# Patient Record
Sex: FEMALE | Race: WHITE
[De-identification: names, ages, dates, MRNs, and addresses within clinical notes are randomized per-mention and may not be internally consistent; named-entity substitution may affect disease eponyms.]

---

## 2018-03-20 ENCOUNTER — HOSPITAL ENCOUNTER (INPATIENT)
Dept: HOSPITAL 92 - L&D | Age: 33
LOS: 2 days | Discharge: HOME | End: 2018-03-22
Attending: FAMILY MEDICINE | Admitting: FAMILY MEDICINE
Payer: MEDICAID

## 2018-03-20 VITALS — BODY MASS INDEX: 35.3 KG/M2

## 2018-03-20 DIAGNOSIS — O99.89: ICD-10-CM

## 2018-03-20 DIAGNOSIS — D64.89: ICD-10-CM

## 2018-03-20 DIAGNOSIS — Z3A.39: ICD-10-CM

## 2018-03-20 DIAGNOSIS — O34.211: Primary | ICD-10-CM

## 2018-03-20 DIAGNOSIS — N73.6: ICD-10-CM

## 2018-03-20 LAB
HBSAG INDEX: 0.3 S/CO (ref 0–0.99)
HGB BLD-MCNC: 12 G/DL (ref 12–16)
MCH RBC QN AUTO: 27.5 PG (ref 27–31)
MCV RBC AUTO: 83.8 FL (ref 81–99)
PLATELET # BLD AUTO: 236 THOU/UL (ref 130–400)
RBC # BLD AUTO: 4.38 MILL/UL (ref 4.2–5.4)
SYPHILIS ANTIBODY INDEX: 0.08 S/CO
WBC # BLD AUTO: 7.9 THOU/UL (ref 4.8–10.8)

## 2018-03-20 PROCEDURE — 87340 HEPATITIS B SURFACE AG IA: CPT

## 2018-03-20 PROCEDURE — 85027 COMPLETE CBC AUTOMATED: CPT

## 2018-03-20 PROCEDURE — 86850 RBC ANTIBODY SCREEN: CPT

## 2018-03-20 PROCEDURE — 36415 COLL VENOUS BLD VENIPUNCTURE: CPT

## 2018-03-20 PROCEDURE — 86901 BLOOD TYPING SEROLOGIC RH(D): CPT

## 2018-03-20 PROCEDURE — 86900 BLOOD TYPING SEROLOGIC ABO: CPT

## 2018-03-20 PROCEDURE — 51702 INSERT TEMP BLADDER CATH: CPT

## 2018-03-20 PROCEDURE — 86780 TREPONEMA PALLIDUM: CPT

## 2018-03-20 NOTE — PDOC.OPDEL
OB Operative/Delivery Note


Delivery Dr/Surgeon: Pancho Dobbins and Dr. Kincaid


Assist: Attending: Dr. Vieyra


Pre-Delivery Diagnosis: scheduled  section (31 yo  @ 39.4 wks by 

15.6 wk sono w/ hx of LTCS)


Procedure/Post Delivery Dx: repeat low transverse CS


Anesthesia: spinal





- Findings


  ** A


Sex: female


Apgar - 1 min: 8


Apgar - 5 min: 9





- Additional Findings/Plan


Placenta delivered: manual removal


 findings: other (dense uterine and abdominal adhesions)


Estimated blood loss: 750


Compilations/Other Findings: 





Procedure:  Repeat low transverse caesarean section





Preoperative Diagnosis: 


1)Term intrauterine pregnancy


2)Previous 


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








Postoperative Diagnosis:  


1)Term intrauterine pregnancy


2)Previous 


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


4)Dense uterine and abdominal adhesions





Anesthesia: spinal





Indications: The patient is a 32year old G4,-->2 female at 39.4 weeks 

gestation by a 15.6 wk sono who presents for a repeat scheduled . 





Procedure in Detail: After risks, benefits, and alternatives were explained to 

the patient, she gave informed consent.  Pre-operative antibiotics included 

Cefazolin 2 gram IV.  The patient was taken to the operating room and spinal 

anesthesia was initiated.  She was placed in the supine position with a left 

tilt and prepped and draped in usual sterile fashion.  A Pfannenstiel incision 

was made with a scalpel and carried down to the level of the fascia which was 

sharply nicked.  The fascial cut was extended bilaterally with Rock scissors.  

The inferior and superior edges of the cut fascial edges were elevated with 

Kocher clamps and the underlying rectus muscles were sharply and bluntly 

dissected free.  The recti were divided digitally and retracted manually.  

There were extensive uterine and abdominal adhesions. The peritoneum was 

entered bluntly and retracted manually.  Bladder blade was placed.  Bladder 

flap was created with Metzenbaum scissors.  A low transverse score was made 

with the scalpel and the uterus was entered in the midline with the scalpel.  

Clear fluid was seen.  The hysterotomy was extended manually.  The infant was 

noted to be ROT and after failed manual delivery was delivered by vacuum and 

fundal pressure.  Mouth and nares were bulb suctioned.  Cord clamped and cut 

and grossly normal female infant was handed to waiting nurse.  Cord blood was 

obtained.  Placenta was manually extracted, found to be intact with 3 vessel 

cord and discarded.  The uterus was unable to be externalized d/t dense 

adhesions.  The bladder blade was replaced and the uterus was closed with a 

running locking 1-0 monocryl, followed by one figure of eight, and one running 

stitch for hemostasis. The fascia was closed with a running non-locking 0-

Vicryl suture.  The subcutaneous tissue was sutured with three simple 

interrupted stitches using a 2-0 plain gut suture. The subcutaneous tissue was 

irrigated and the bovie was used to establish hemostasis of bleeders. The skin 

was sutured with 4-0 monocryl using the subcuticular fashion. Dermabond was 

applied followed by a pressure dressing.  All counts were correct.  The patient 

tolerated the procedure well and was taken to the recovery room in stable 

condition.  





Estimated Blood Loss: 750 ml





Complications: Dense adhesions; vacuum assisted delivery


Specimens: Cord blood sent to lab for blood type


Findings: Grossly normal female infant with apgars of 8 and 9.  Grossly normal 

placenta with 3 vessel cord discarded.


Drains:  Vaughn to gravity draining clear urine








Post delivery plan: routine recovery





<Lisa Deleon - Last Filed: 18 09:55>





- Additional Findings/Plan


Compilations/Other Findings: 





Note...the peritoneum was fused to underlying adhesions. Not able to bluntly 

disect. Bladder flap was made but bladder was also densely adhered to 

peritoneum and uterus. Vacuum extraction was needed due to tight adhesion 

restricting delivery of the fetal head. Sepra-film was placed over hysterotomy 

in hopes to decrease subsequent adhesions. 





Roly








<Inocencia Vieyra - Last Filed: 18 12:44>





Attending Addendum





- Attending Addendum


Date/Time: 18 1242





I personally evaluated and participated in the procedure as discribed above 

with Dr. Pancho Dobbins and Dr. Kincaid


I agree with the resident documentation. 





Would caution future pregnancies due to adhesions. Urine was clear in Vaughn and 

tube during and upon completion of procedure. 


 


Roly





<Inocencia Vieyra - Last Filed: 18 12:44>

## 2018-03-20 NOTE — PDOC.PP
Post Partum Progress Note


Post Partum Day #: 0


Subjective: 





Doing well, starting to feel/move legs, has had clears, no solid food yet, has 

not gotten out of bed yet. Nursing 


PO intake tolerated: yes


Flatus: no


Ambulation: no


 Vital Signs (12 hours)











  Temp Pulse Resp


 


 18 07:20  98.0 F  72  18








 Weight











Weight                         84.822 kg

















- Physical Examination


General: NAD


Cardiovascular: no m/r/g, RRR


Respiratory: clear to auscultation bilaterally, non-labored breathing


Abdominal: + bowel sounds, lochia, no distention, appropriately TTP


Skin: CS incision dry & intact


Psychiatric: A&Ox3, normal affect


Result Diagrams: 


 18 06:24





Additional Labs: 


 Post Partum Labs











Blood Type  O POSITIVE   18  06:24    


 


Hep Bs Antigen  Non-Reactive S/CO (NonReactive)   18  06:24    








50 CC output recorded


(1) Previous  section


Code(s): Z98.891 - HISTORY OF UTERINE SCAR FROM PREVIOUS SURGERY   Status: 

Acute   Comment: Hx of previous LTCS with vertical skin incision, multitude of 

adhesions found in surgery and had to use vacuum assist for delivery. Doing 

well 4 hours post op. Follow up H/H in AM   





(2) Anemia affecting pregnancy


Code(s): O99.019 - ANEMIA COMPLICATING PREGNANCY, UNSPECIFIED TRIMESTER   Status

: Acute   Comment: Hx of anemia, Hgb 12 today, follow up H/H AM and iron as 

needed   





(3) Obesity (BMI 35.0-39.9 without comorbidity)


Code(s): E66.9 - OBESITY, UNSPECIFIED   Status: Acute   





<Turtle,Zena - Last Filed: 18 13:08>


 Vital Signs (12 hours)











  Temp Pulse Resp BP Pulse Ox


 


 18 13:00  98.3 F  62  18  120/64 


 


 18 12:00  98.3 F  62  18  111/56 L  96


 


 18 07:20  98.0 F  72  18  








 Weight











Weight                         84.822 kg














Result Diagrams: 


 18 06:24





Additional Labs: 


 Post Partum Labs











Blood Type  O POSITIVE   18  06:24    


 


Hep Bs Antigen  Non-Reactive S/CO (NonReactive)   18  06:24    














<Inocencia Vieyra - Last Filed: 18 14:50>





Attending Addendum





- Attending Addendum


Date/Time: 18 6534





I personally evaluated the patient and discussed the management with Dr. Self


I agree with the History, Examination, Assessment and Plan documented above 

with any addition or exceptions noted below.





33 yo  female s/p RLTCS with vacuum assistance complicated by dense 

adhesion on 3/20/18. 


POD/PPD #0


Doing well. Tolerating clears. Vaughn still in place. Adequate UOP. Will advance 

diet as tolerated. 


Continue routine pp care. 





ABrayMD





<Inocencia Vieyra - Last Filed: 18 14:50>

## 2018-03-20 NOTE — PDOC.LDHP
Labor and Delivery H&P


Chief complaint: scheduled  section


HPI: 





31 yo  presents for scheduled repeat  section.


Current gestational age (weeks): 39 (39.4/15.6w sono)


Due date: 18


Dating criteria: second trimester ultrasound


Grav: 4


Para: 1 (1021)


Current pregnancy complications: other (preE in first pregnancy)


Abnormal US findings: No


Past Medical History: 





possible preE in prior pregnancy, anemia of pregnancy


Current medications: pre-fabrizio vitamins, iron


Previous surgical history: dilation and curettage, other (C/S with unknown scar 

type)


Social history: none





- Physical Exam


Vital signs reviewed and normal: yes


General: resting


Heart: RRR


Lungs: nonlabored breathing


Abdomen: NTTP


Extremeties: no edema


FHT: category 1 (130/mod/no accel/no decel)


Kinder contractions every: N/A





- OB Labs


Blood type: O


RH: positive


Antibody Screen: negative


HIV: negative


RPR: negative


HEPSAg: negative


1 hour GCT: negative (108)


GBS: negative


Rubella: immune


Additional Labs: 





Dengue negative, Zika negative, Quad screen reassuring, GC/Chl neg, pap NILM





- Assessment


L&D Assessment: scheduled repeat  section





- Plan


Plan: admit to L&D, to OR for  section


-: 





1. H/o cesearean section


- Per patient, emergency c/s 2/2 elevated BP


- Risks, benefits and alternatives discussed and she desires to proceed with 

repeat C/S





2. Anemia of pregnancy


- H/H WNL





3. H/o preE in 1st pregnancy


- BP WNL, no preE symptoms at this time











<Sandy Kincaid - Last Filed: 18 07:18>





<Inocencia Vieyra - Last Filed: 18 12:52>


Allergies/Adverse Reactions: 


 Allergies











Allergy/AdvReac Type Severity Reaction Status Date / Time


 


No Known Allergies Allergy   Verified 18 06:15














Attending Addendum





- Attending Addendum


Date/Time: 18 0966





I personally evaluated the patient and discussed the management with Dr. Kincaid 

and Dr. Pancho Dobbins


I agree with the History, Examination, Assessment and Plan documented above 

with any addition or exceptions noted below.





31 yo  female at 39.4 wks by 15.6 wks sono admitted for scheduled RLTCS. 


Uncomplicated pregnancy. Labs and sono reviewed. 


Discussed R/B/A. Agrees to repeat LTCS. Does not want vertical skin incision 

for this  delivery. Discussed options. Patient expressed desire for 

pfannenstiel incision. 


Will proceed to OR when available. 





Roly





<Inocencia Vieyra - Last Filed: 18 12:52>

## 2018-03-21 LAB
HGB BLD-MCNC: 10.5 G/DL (ref 12–16)
MCH RBC QN AUTO: 27.9 PG (ref 27–31)
MCV RBC AUTO: 84.1 FL (ref 81–99)
PLATELET # BLD AUTO: 181 THOU/UL (ref 130–400)
RBC # BLD AUTO: 3.77 MILL/UL (ref 4.2–5.4)
WBC # BLD AUTO: 8.6 THOU/UL (ref 4.8–10.8)

## 2018-03-21 RX ADMIN — HYDROCODONE BITARTRATE AND ACETAMINOPHEN PRN TAB: 5; 325 TABLET ORAL at 09:17

## 2018-03-21 NOTE — PDOC.PP
Post Partum Progress Note


Post Partum Day #: 1


Subjective: 





She slept well and is ambulating to use the restroom. She has not passed flatus 

or stool yet. She is tolerating a normal diet and says her pain is controlled.


PO intake tolerated: yes


Flatus: no (to the bathroom)


Ambulation: yes


 Vital Signs (12 hours)











  Temp Pulse Resp BP


 


 18 04:00  99.1 F  69  18  124/72


 


 18 02:05    18 


 


 18 00:00  97.7 F  61  20  108/56 L


 


 18 22:00    18 


 


 18 20:00  97.7 F  68  20  110/56 L








 Weight











Weight                         84.822 kg

















- Physical Examination


General: NAD


Cardiovascular: no m/r/g, RRR


Respiratory: clear to auscultation bilaterally, non-labored breathing


Abdominal: + bowel sounds, appropriately TTP


Skin: CS incision dry & intact


Neurological: no gross focal deficits


Psychiatric: A&Ox3, normal affect


Result Diagrams: 


 18 05:39





Additional Labs: 


 Post Partum Labs











Blood Type  O POSITIVE   18  06:24    


 


Hep Bs Antigen  Non-Reactive S/CO (NonReactive)   18  06:24    











(1) Term pregnancy delivered


Code(s): O80 - ENCOUNTER FOR FULL-TERM UNCOMPLICATED DELIVERY   Status: Acute   





(2) Vacuum-assisted  delivery, delivered, current hospitalization


Code(s): O66.5 - ATTEMPTED APPLICATION OF VACUUM EXTRACTOR AND FORCEPS   Status

: Acute   





(3) Anemia affecting pregnancy


Code(s): O99.019 - ANEMIA COMPLICATING PREGNANCY, UNSPECIFIED TRIMESTER   Status

: Acute   Comment: Hx of anemia, Hgb 12 today, follow up H/H AM and iron as 

needed   





(4) Obesity (BMI 35.0-39.9 without comorbidity)


Code(s): E66.9 - OBESITY, UNSPECIFIED   Status: Acute   





(5) Previous  section


Code(s): Z98.891 - HISTORY OF UTERINE SCAR FROM PREVIOUS SURGERY   Status: 

Acute   Comment: Hx of previous LTCS with vertical skin incision, multitude of 

adhesions found in surgery and had to use vacuum assist for delivery. Doing 

well 4 hours post op. Follow up H/H in AM   





- Assessment/Plan





31 yo  @ 39.4/15.6w sono presents for scheduled repeat  section.





1. term, sIUP, delivered via RLTCS, vacuum assisted.


-Pt had dense adhesions; vacuum assisted delivery


-Pt doing well. Ambulating. Not passing flatus or stool, but voiding 

adequately. Tolerating a normal diet.


-Ibuprofen, toradol, and norco prn pain control


-EBL 750ml; H/H down to 10.5


-DC oneill today





2. Anemia of pregnancy


- H/H wnl initially but is down to 10.5 today; will continue to monitor; 

continue PO iron and docusate/senna for constipation.





3. H/o preE in 1st pregnancy


- BP WNL, no preE symptoms at this time





<Lisa Deleon - Last Filed: 18 06:24>


 Vital Signs (12 hours)











  Temp Pulse Resp BP


 


 18 07:42  99.0 F  81  20  118/63


 


 18 07:40  99.0 F  81  20 








 Weight











Weight                         84.822 kg














Result Diagrams: 


 18 05:39





Additional Labs: 


 Post Partum Labs











Blood Type  O POSITIVE   18  06:24    


 


Hep Bs Antigen  Non-Reactive S/CO (NonReactive)   18  06:24    














<Inocencia Vieyra - Last Filed: 18 17:14>





Attending Addendum





- Attending Addendum


Date/Time: 18 1712





I personally evaluated the patient and discussed the management with Dr. Deleon

, Dr. Shen, and Dr. Self


I agree with the History, Examination, Assessment and Plan documented above 

with any addition or exceptions noted below.





31 yo  female s/p RLTCS with vacuum assistance complicated by dense 

adhesion on 3/20/18. 


POD/PPD #1


Doing well. Tolerating PO. Voiding well. Adequate UOP. Lochia appropriate. 

Incision healing well. Pain controlled. Breast feeding without difficulty. 


Continue routine pp care. 


Would caution future pregnancies. 





ABrayMD





<Inocencia Vieyra - Last Filed: 18 17:14>

## 2018-03-22 VITALS — SYSTOLIC BLOOD PRESSURE: 118 MMHG | DIASTOLIC BLOOD PRESSURE: 63 MMHG | TEMPERATURE: 99 F

## 2018-03-22 RX ADMIN — HYDROCODONE BITARTRATE AND ACETAMINOPHEN PRN TAB: 5; 325 TABLET ORAL at 14:40

## 2018-03-22 NOTE — PDOC.PP
Post Partum Progress Note


Post Partum Day #: 2


Subjective: 





Pt doing well. Voiding, ambulating, passing flatus, tolerating a normal diet 

and pain is controlled.


PO intake tolerated: yes


Flatus: yes


Ambulation: yes


 Vital Signs (12 hours)











  Temp Pulse Resp BP


 


 18 07:42  99.0 F  81  20  118/63


 


 18 04:15  98.6 F  85  20 


 


 18 00:30  98.6 F  85  20  112/59 L








 Weight











Weight                         84.822 kg

















- Physical Examination


General: NAD


Cardiovascular: no m/r/g, RRR


Respiratory: clear to auscultation bilaterally


Abdominal: + bowel sounds


Skin: CS incision dry & intact


Neurological: no gross focal deficits


Psychiatric: A&Ox3, normal affect


Result Diagrams: 


 18 05:39





Additional Labs: 


 Post Partum Labs











Blood Type  O POSITIVE   18  06:24    


 


Hep Bs Antigen  Non-Reactive S/CO (NonReactive)   18  06:24    











(1) Term pregnancy delivered


Code(s): O80 - ENCOUNTER FOR FULL-TERM UNCOMPLICATED DELIVERY   Status: Acute   





(2) Vacuum-assisted  delivery, delivered, current hospitalization


Code(s): O66.5 - ATTEMPTED APPLICATION OF VACUUM EXTRACTOR AND FORCEPS   Status

: Acute   





(3) Anemia affecting pregnancy


Code(s): O99.019 - ANEMIA COMPLICATING PREGNANCY, UNSPECIFIED TRIMESTER   Status

: Acute   





(4) Obesity (BMI 35.0-39.9 without comorbidity)


Code(s): E66.9 - OBESITY, UNSPECIFIED   Status: Acute   





(5) Previous  section


Code(s): Z98.891 - HISTORY OF UTERINE SCAR FROM PREVIOUS SURGERY   Status: 

Acute   





- Assessment/Plan





31 yo  @ 39.4/15.6w jen presents for scheduled repeat  section. 

POD 2





1. term, sIUP, delivered via RLTCS, vacuum assisted.


-Pt had dense adhesions; vacuum assisted delivery


-Pt doing well. Ambulating. Passing flatus, voiding adequately. Tolerating a 

normal diet.


-Ibuprofen for pain


-EBL 750ml; H/H down to 10.5


-Okay to dc today





2. Anemia of pregnancy


- H/H wnl initially but is down to 10.5; will continue to monitor; continue PO 

iron and docusate/senna for constipation.





3. H/o preE in 1st pregnancy


- BP WNL, no preE symptoms at this time








<Lisa Deleon - Last Filed: 18 08:32>


 Vital Signs (12 hours)











  Temp Pulse Resp BP


 


 18 07:42  99.0 F  81  20  118/63


 


 18 07:40  99.0 F  81  20 








 Weight











Weight                         84.822 kg














Result Diagrams: 


 18 05:39





Additional Labs: 


 Post Partum Labs











Blood Type  O POSITIVE   18  06:24    


 


Hep Bs Antigen  Non-Reactive S/CO (NonReactive)   18  06:24    














<Inocencia Vieyra - Last Filed: 18 17:17>





Attending Addendum





- Attending Addendum


Date/Time: 18 1717





I personally evaluated the patient and discussed the management with Dr. Deleon

, Dr. Shen, and Dr. Self


I agree with the History, Examination, Assessment and Plan documented above 

with any addition or exceptions noted below.





31 yo  female s/p RLTCS with vacuum assistance complicated by dense 

adhesion on 3/20/18. 


POD/PPD #2


Doing well. Tolerating PO. Voiding well. Adequate UOP. Lochia appropriate. 

Incision healing well. Pain controlled. Breast feeding without difficulty. 

Ambulating well. 


Continue routine pp care. 


Would caution future pregnancies. 





ABrayMD








<Inocencia Vieyra - Last Filed: 18 17:17>